# Patient Record
Sex: MALE | Race: WHITE | NOT HISPANIC OR LATINO
[De-identification: names, ages, dates, MRNs, and addresses within clinical notes are randomized per-mention and may not be internally consistent; named-entity substitution may affect disease eponyms.]

---

## 2023-06-01 ENCOUNTER — APPOINTMENT (OUTPATIENT)
Dept: PAIN MANAGEMENT | Facility: CLINIC | Age: 73
End: 2023-06-01
Payer: MEDICARE

## 2023-06-01 VITALS — WEIGHT: 149 LBS | HEIGHT: 65 IN | BODY MASS INDEX: 24.83 KG/M2

## 2023-06-01 DIAGNOSIS — Z87.39 PERSONAL HISTORY OF OTHER DISEASES OF THE MUSCULOSKELETAL SYSTEM AND CONNECTIVE TISSUE: ICD-10-CM

## 2023-06-01 DIAGNOSIS — M79.7 FIBROMYALGIA: ICD-10-CM

## 2023-06-01 PROCEDURE — 99212 OFFICE O/P EST SF 10 MIN: CPT

## 2023-06-01 RX ORDER — PREGABALIN 300 MG/1
CAPSULE ORAL
Refills: 0 | Status: ACTIVE | COMMUNITY

## 2023-06-01 RX ORDER — ROPINIROLE 5 MG/1
5 TABLET, FILM COATED ORAL
Refills: 0 | Status: ACTIVE | COMMUNITY

## 2023-06-01 NOTE — DISCUSSION/SUMMARY
[de-identified] : \par After discussing various treatment options with the patient including but not limited to oral medications, physical therapy, exercise, modalities as well as interventional spinal injections, we have decided with the following plan: \par \par - pharmacological: c/w lyrica 225mg BID\par   - we discussed the lack of benefit of opioid therapy for this type of pain\par - encouraged exercise. offered PT referral, but patient deferred \par - alternative list of pain providers given \par - follow up as needed\par \par Jose Wong MD\par \par

## 2023-06-01 NOTE — PHYSICAL EXAM
[de-identified] : Gen: NAD\par Gait: antalgic\par Psych:  Mood appropriate for given condition\par ROM: limited AROM of the L spine in all planes, full ROM at ankles, knees and hips  \par Sensation: intact to light touch in all dermatomes tested from L2-S1 bilaterally\par Reflexes: 2+ b/l patella and Achilles\par Palpation: Diffusely tender over lumbar paraspinals  -TTP over the b/l greater trochanters and bilateral SI joint\par Provacative tests: -SLR, and seated root (slump test) on the L/R, neg Reyna, JEB testing, FADIR testing\par \par 				Right	Left\par L2/3	Hip flexion		 5	 5\par L3/4	Knee Extension		 5	 5\par L4/5	Ankle Dorsiflexion 	 5	 5\par L5/S1	Great toe extension	 5	 5\par L4/5	Inversion		 5	 5\par L5/S1	Eversion		 5	 5\par

## 2023-06-01 NOTE — HISTORY OF PRESENT ILLNESS
[Head] : head [Neck] : neck [Upper back] : upper back [Lower back] : lower back [Left Arm] : left arm [Left Leg] : left leg [Right Arm] : right arm [Right Leg] : right leg [10] : 10 [Dull/Aching] : dull/aching [Radiating] : radiating [Sharp] : sharp [Shooting] : shooting [Stabbing] : stabbing [Tingling] : tingling [Constant] : constant [Household chores] : household chores [Leisure] : leisure [Sleep] : sleep [Social interactions] : social interactions [Nothing helps with pain getting better] : Nothing helps with pain getting better [Sitting] : sitting [Standing] : standing [Walking] : walking [Lying in bed] : lying in bed [FreeTextEntry1] : Initial HPI 6/1/23\par Mr. DELCID is a 72 year old M w/ hx fibromyalgia who presents for initial evaluation for multijoint and myofascial pain related to his fibromyalgia. Pain is located in shoulders, elbows, hands, hips, knees, feet. Pain started 30+ years ago and pain rated 7/10 or higher. It is not associated with any inciting injury. Pain is described as aching, shooting.  Pain is worsened with sitting, standing, laying down. Patient has failed conservative treatment with acetaminophen, NSAIDs, muscle relaxants, and physical therapy/HEP. Pain is causing significant functional limitation resulting in diminished quality of life and impaired age appropriate ADL's. Patient denies loss of bowel/bladder function, new motor deficits, fevers, or chills. He takes lyrica 225mg BID, which helps his pain. Has tried and failed numerous other antidepressants (TCAs, SNRIs). \par  [] : no [FreeTextEntry6] : numbness  [de-identified] : activity

## 2024-08-08 ENCOUNTER — APPOINTMENT (OUTPATIENT)
Dept: ORTHOPEDIC SURGERY | Facility: CLINIC | Age: 74
End: 2024-08-08

## 2024-08-08 PROBLEM — M54.9 BACK PAIN: Status: ACTIVE | Noted: 2024-08-08

## 2024-08-08 PROCEDURE — 72170 X-RAY EXAM OF PELVIS: CPT

## 2024-08-08 PROCEDURE — 72070 X-RAY EXAM THORAC SPINE 2VWS: CPT

## 2024-08-08 PROCEDURE — 99203 OFFICE O/P NEW LOW 30 MIN: CPT

## 2024-08-08 PROCEDURE — 72110 X-RAY EXAM L-2 SPINE 4/>VWS: CPT

## 2024-08-08 NOTE — HISTORY OF PRESENT ILLNESS
[de-identified] : 08/08/2024: Patient is here for evaluation of pain across the whole back due to kyphosis. Patient has pain while reaching for things, eating, most walking, most daily activities. Patient has been diagnosed with kyphosis for 2-3 years, first time coming to an orthopedist doctor.

## 2024-08-08 NOTE — ASSESSMENT
[FreeTextEntry1] : Discussed overall kyphosis Chronic appearing wedging thoracic spine Aware of renal stones on XR

## 2024-08-08 NOTE — IMAGING
[de-identified] : Skin intact T/L spine Motor exam non-focal, ambulating around room [Facet arthropathy] : Facet arthropathy [Spondylolithesis] : Spondylolithesis [Disc space narrowing] : Disc space narrowing [FreeTextEntry1] : Chronic appearing wedging

## 2024-08-19 ENCOUNTER — APPOINTMENT (OUTPATIENT)
Dept: PAIN MANAGEMENT | Facility: CLINIC | Age: 74
End: 2024-08-19

## 2024-08-19 VITALS — WEIGHT: 164 LBS | HEIGHT: 65 IN | BODY MASS INDEX: 27.32 KG/M2

## 2024-08-19 DIAGNOSIS — M79.18 MYALGIA, OTHER SITE: ICD-10-CM

## 2024-08-19 DIAGNOSIS — M40.14 OTHER SECONDARY KYPHOSIS, THORACIC REGION: ICD-10-CM

## 2024-08-19 PROCEDURE — 99204 OFFICE O/P NEW MOD 45 MIN: CPT

## 2024-08-19 RX ORDER — DICLOFENAC SODIUM 50 MG/1
50 TABLET, DELAYED RELEASE ORAL DAILY
Qty: 30 | Refills: 1 | Status: ACTIVE | COMMUNITY
Start: 2024-08-19 | End: 1900-01-01

## 2024-08-19 NOTE — HISTORY OF PRESENT ILLNESS
[Neck] : neck [Upper back] : upper back [Mid-back] : mid-back [Lower back] : lower back [Buttock] : buttock [10] : 10 [Burning] : burning [Dull/Aching] : dull/aching [Radiating] : radiating [Sharp] : sharp [Shooting] : shooting [Stabbing] : stabbing [Throbbing] : throbbing [Tightness] : tightness [Tingling] : tingling [Constant] : constant [Household chores] : household chores [Leisure] : leisure [Sleep] : sleep [Nothing helps with pain getting better] : Nothing helps with pain getting better [Sitting] : sitting [Standing] : standing [Walking] : walking [Stairs] : stairs [Lying in bed] : lying in bed [] : no [FreeTextEntry6] : numbness and tingling in b/l legs or hands [FreeTextEntry1] : b/l arms and legs  [FreeTextEntry7] : b/l legs or arms  [de-identified] : x-ray

## 2024-08-19 NOTE — PHYSICAL EXAM
[de-identified] : Gen: NAD, patient has kyphosis, uses a cane to assist with ambulation  Head: NC/AT Eyes: no glasses, no scleral icterus ENT: mucous membranes moist CV: No JVD Lungs: nonlabored breathing Abd: soft, NT/ND Ext: full ROM in all extremities, no peripheral edema Back: +TTP in the bilateral low lumbar facet region, very limited extension  Neuro: CN intact LEs +5 L +5 R hip flexion +5 L +5 R leg extension +5 L +5 R leg flexion +5 L +5 R foot dorsiflexion +5 L +5 R foot plantarflexion +5 L +5 R EHL extension Psych: normal affect Skin: no visible lesions

## 2024-08-19 NOTE — DISCUSSION/SUMMARY
[de-identified] : ANIA DELCID is a 74 year-old man presenting for a NPV for a history of chronic low back pain.   Prior treatment: Pregabalin 225mg  Cyclobenzaprine  Plan: 1) X-ray lumbar spine images reviewed with the patient - patient with diffuse degenerative changes in the spine, including anterolisthesis of L4 on L5 2) Obtain MRI lumbar spine w/o contrast 3) Continue pregabalin 225mg BID; denies AEs 4) Trial diclofenac 50mg daily prn; Discussed the risks of NSAIDs, including worsening HTN, cardiovascular risks, GI risk, renal injury. The patient was told not to take other NSAIDs with this and to consult with their PCP if there is a significant medical history to warrant this prior to initiating treatment. 5) RTC 2-4 weeks

## 2024-08-19 NOTE — PHYSICAL EXAM
[de-identified] : Gen: NAD, patient has kyphosis, uses a cane to assist with ambulation  Head: NC/AT Eyes: no glasses, no scleral icterus ENT: mucous membranes moist CV: No JVD Lungs: nonlabored breathing Abd: soft, NT/ND Ext: full ROM in all extremities, no peripheral edema Back: +TTP in the bilateral low lumbar facet region, very limited extension  Neuro: CN intact LEs +5 L +5 R hip flexion +5 L +5 R leg extension +5 L +5 R leg flexion +5 L +5 R foot dorsiflexion +5 L +5 R foot plantarflexion +5 L +5 R EHL extension Psych: normal affect Skin: no visible lesions

## 2024-08-19 NOTE — HISTORY OF PRESENT ILLNESS
[Neck] : neck [Upper back] : upper back [Mid-back] : mid-back [Lower back] : lower back [Buttock] : buttock [10] : 10 [Burning] : burning [Dull/Aching] : dull/aching [Radiating] : radiating [Sharp] : sharp [Shooting] : shooting [Stabbing] : stabbing [Throbbing] : throbbing [Tightness] : tightness [Tingling] : tingling [Constant] : constant [Household chores] : household chores [Leisure] : leisure [Sleep] : sleep [Nothing helps with pain getting better] : Nothing helps with pain getting better [Sitting] : sitting [Standing] : standing [Walking] : walking [Stairs] : stairs [Lying in bed] : lying in bed [] : no [FreeTextEntry1] : b/l arms and legs  [FreeTextEntry6] : numbness and tingling in b/l legs or hands [FreeTextEntry7] : b/l legs or arms  [de-identified] : x-ray

## 2024-08-19 NOTE — DISCUSSION/SUMMARY
[de-identified] : ANIA DELCID is a 74 year-old man presenting for a NPV for a history of chronic low back pain.   Prior treatment: Pregabalin 225mg  Cyclobenzaprine  Plan: 1) X-ray lumbar spine images reviewed with the patient - patient with diffuse degenerative changes in the spine, including anterolisthesis of L4 on L5 2) Obtain MRI lumbar spine w/o contrast 3) Continue pregabalin 225mg BID; denies AEs 4) Trial diclofenac 50mg daily prn; Discussed the risks of NSAIDs, including worsening HTN, cardiovascular risks, GI risk, renal injury. The patient was told not to take other NSAIDs with this and to consult with their PCP if there is a significant medical history to warrant this prior to initiating treatment. 5) RTC 2-4 weeks

## 2024-08-26 ENCOUNTER — APPOINTMENT (OUTPATIENT)
Dept: MRI IMAGING | Facility: CLINIC | Age: 74
End: 2024-08-26
Payer: MEDICARE

## 2024-08-26 PROCEDURE — 72146 MRI CHEST SPINE W/O DYE: CPT | Mod: MH

## 2024-08-30 PROBLEM — M51.36 DEGENERATIVE DISC DISEASE, LUMBAR: Status: ACTIVE | Noted: 2024-08-19

## 2024-08-30 PROBLEM — M54.42 CHRONIC BILATERAL LOW BACK PAIN WITH BILATERAL SCIATICA: Status: ACTIVE | Noted: 2024-08-19

## 2024-09-03 ENCOUNTER — APPOINTMENT (OUTPATIENT)
Dept: PAIN MANAGEMENT | Facility: CLINIC | Age: 74
End: 2024-09-03
Payer: MEDICARE

## 2024-09-03 VITALS — WEIGHT: 163 LBS | BODY MASS INDEX: 27.16 KG/M2 | HEIGHT: 65 IN

## 2024-09-03 DIAGNOSIS — M54.41 LUMBAGO WITH SCIATICA, LEFT SIDE: ICD-10-CM

## 2024-09-03 DIAGNOSIS — M54.42 LUMBAGO WITH SCIATICA, LEFT SIDE: ICD-10-CM

## 2024-09-03 DIAGNOSIS — M79.7 MYALGIC ENCEPHALOMYELITIS/CHRONIC FATIGUE SYNDROME: ICD-10-CM

## 2024-09-03 DIAGNOSIS — Z87.448 PERSONAL HISTORY OF OTHER DISEASES OF URINARY SYSTEM: ICD-10-CM

## 2024-09-03 DIAGNOSIS — G93.32 MYALGIC ENCEPHALOMYELITIS/CHRONIC FATIGUE SYNDROME: ICD-10-CM

## 2024-09-03 DIAGNOSIS — G89.29 LUMBAGO WITH SCIATICA, LEFT SIDE: ICD-10-CM

## 2024-09-03 DIAGNOSIS — M51.36 OTHER INTERVERTEBRAL DISC DEGENERATION, LUMBAR REGION: ICD-10-CM

## 2024-09-03 PROCEDURE — 99214 OFFICE O/P EST MOD 30 MIN: CPT

## 2024-09-03 NOTE — DISCUSSION/SUMMARY
[de-identified] : ANIA DELCID is a 74 year-old man presenting for a RPV for a history of chronic low back pain.   Prior treatment: Pregabalin 225mg Duloxetine Cyclobenzaprine  Plan: 1) MRI lumbar spine images reviewed with the patient. 2) Discussed L5-S1 ILESI w/o MAC. The procedure was explained to the patient in detail. Reviewed risks, benefits, and alternatives with the patient. Some risks discussed included temporary increase in pain, bleeding, infection, and side effects from steroids. The patient expressed understanding and would like to defer.  3) Continue pregabalin 225mg BID; denies AEs 4) Avoid NSAIDS 2/2 CKD 5) Had an extensive discussion with the patient regarding chronic benzodiazepines and discussed that I cannot write him for this medication--patient is requesting diazepam 6) RTC prn

## 2024-09-03 NOTE — HISTORY OF PRESENT ILLNESS
[Neck] : neck [Upper back] : upper back [Mid-back] : mid-back [Lower back] : lower back [Buttock] : buttock [10] : 10 [Burning] : burning [Dull/Aching] : dull/aching [Radiating] : radiating [Sharp] : sharp [Shooting] : shooting [Stabbing] : stabbing [Throbbing] : throbbing [Tightness] : tightness [Tingling] : tingling [Constant] : constant [Household chores] : household chores [Leisure] : leisure [Sleep] : sleep [Nothing helps with pain getting better] : Nothing helps with pain getting better [Sitting] : sitting [Standing] : standing [Walking] : walking [Stairs] : stairs [Lying in bed] : lying in bed [] : no [FreeTextEntry1] : b/l arms and legs  [FreeTextEntry6] : numbness and tingling in b/l legs or hands [FreeTextEntry7] : b/l legs or arms  [de-identified] : x-ray

## 2024-09-03 NOTE — PHYSICAL EXAM
[de-identified] : Gen: NAD, patient has kyphosis, uses a cane to assist with ambulation  Head: NC/AT Eyes: no glasses, no scleral icterus ENT: mucous membranes moist CV: No JVD Lungs: nonlabored breathing Abd: soft, NT/ND Ext: full ROM in all extremities, no peripheral edema Back: +TTP in the bilateral low lumbar facet region, very limited extension  Neuro: CN intact LEs +5 L +5 R hip flexion +5 L +5 R leg extension +5 L +5 R leg flexion +5 L +5 R foot dorsiflexion +5 L +5 R foot plantarflexion +5 L +5 R EHL extension Psych: normal affect Skin: no visible lesions

## 2024-09-03 NOTE — HISTORY OF PRESENT ILLNESS
[Neck] : neck [Upper back] : upper back [Mid-back] : mid-back [Lower back] : lower back [Buttock] : buttock [10] : 10 [Burning] : burning [Dull/Aching] : dull/aching [Radiating] : radiating [Sharp] : sharp [Shooting] : shooting [Stabbing] : stabbing [Throbbing] : throbbing [Tightness] : tightness [Tingling] : tingling [Constant] : constant [Household chores] : household chores [Leisure] : leisure [Sleep] : sleep [Nothing helps with pain getting better] : Nothing helps with pain getting better [Sitting] : sitting [Standing] : standing [Walking] : walking [Stairs] : stairs [Lying in bed] : lying in bed [] : no [FreeTextEntry1] : b/l arms and legs  [FreeTextEntry6] : numbness and tingling in b/l legs or hands [FreeTextEntry7] : b/l legs or arms  [de-identified] : x-ray

## 2024-09-03 NOTE — HISTORY OF PRESENT ILLNESS
[Neck] : neck [Upper back] : upper back [Mid-back] : mid-back [Lower back] : lower back [Buttock] : buttock [10] : 10 [Burning] : burning [Dull/Aching] : dull/aching [Radiating] : radiating [Sharp] : sharp [Shooting] : shooting [Stabbing] : stabbing [Throbbing] : throbbing [Tightness] : tightness [Tingling] : tingling [Constant] : constant [Household chores] : household chores [Leisure] : leisure [Sleep] : sleep [Nothing helps with pain getting better] : Nothing helps with pain getting better [Sitting] : sitting [Standing] : standing [Walking] : walking [Stairs] : stairs [Lying in bed] : lying in bed [] : no [FreeTextEntry1] : b/l arms and legs  [FreeTextEntry6] : numbness and tingling in b/l legs or hands [FreeTextEntry7] : b/l legs or arms  [de-identified] : x-ray

## 2024-09-03 NOTE — PHYSICAL EXAM
[de-identified] : Gen: NAD, patient has kyphosis, uses a cane to assist with ambulation  Head: NC/AT Eyes: no glasses, no scleral icterus ENT: mucous membranes moist CV: No JVD Lungs: nonlabored breathing Abd: soft, NT/ND Ext: full ROM in all extremities, no peripheral edema Back: +TTP in the bilateral low lumbar facet region, very limited extension  Neuro: CN intact LEs +5 L +5 R hip flexion +5 L +5 R leg extension +5 L +5 R leg flexion +5 L +5 R foot dorsiflexion +5 L +5 R foot plantarflexion +5 L +5 R EHL extension Psych: normal affect Skin: no visible lesions

## 2024-09-03 NOTE — PHYSICAL EXAM
[de-identified] : Gen: NAD, patient has kyphosis, uses a cane to assist with ambulation  Head: NC/AT Eyes: no glasses, no scleral icterus ENT: mucous membranes moist CV: No JVD Lungs: nonlabored breathing Abd: soft, NT/ND Ext: full ROM in all extremities, no peripheral edema Back: +TTP in the bilateral low lumbar facet region, very limited extension  Neuro: CN intact LEs +5 L +5 R hip flexion +5 L +5 R leg extension +5 L +5 R leg flexion +5 L +5 R foot dorsiflexion +5 L +5 R foot plantarflexion +5 L +5 R EHL extension Psych: normal affect Skin: no visible lesions

## 2024-09-03 NOTE — DATA REVIEWED
[MRI] : MRI [Lumbar Spine] : lumbar spine [Report was reviewed and noted in the chart] : The report was reviewed and noted in the chart [I independently reviewed and interpreted images and report] : I independently reviewed and interpreted images and report [FreeTextEntry1] : 8/26/2024 MRI Lumbar Spine O&C L2-L3: minor retrolisthesis; broad disc bulge, facet hypertrophy, ligamentum flavum hypertrophy, left facet effusion, inferior RIGHT foraminal herniation, NF stenosis RIGHT > left; mild central stenosis L3-L4: minor retrolisthesis; broad disc bulge, facet hypertrophy, ligamentum flavum hypertrophy, facet effusion w/ foraminal extension of bulges, bilateral NF stenosis; broad central herniation, mild central stenosis L4-L5: minor retrolisthesis; broad disc bulge, facet hypertrophy, foraminal extension of bulges w/ bilateral inferior NF stenosis, broad disc bulge w/ mild central stenosis L5-S1: grade I anterolisthesis, broad disc bulge, facet hypertrophy, ligamentum flavum hypertrophy, facet effusion w/ foraminal extension of bulges and NF stenosis

## 2024-09-03 NOTE — DISCUSSION/SUMMARY
[de-identified] : ANIA DELCID is a 74 year-old man presenting for a RPV for a history of chronic low back pain.   Prior treatment: Pregabalin 225mg Duloxetine Cyclobenzaprine  Plan: 1) MRI lumbar spine images reviewed with the patient. 2) Discussed L5-S1 ILESI w/o MAC. The procedure was explained to the patient in detail. Reviewed risks, benefits, and alternatives with the patient. Some risks discussed included temporary increase in pain, bleeding, infection, and side effects from steroids. The patient expressed understanding and would like to defer.  3) Continue pregabalin 225mg BID; denies AEs 4) Avoid NSAIDS 2/2 CKD 5) Had an extensive discussion with the patient regarding chronic benzodiazepines and discussed that I cannot write him for this medication--patient is requesting diazepam 6) RTC prn

## 2024-09-03 NOTE — DISCUSSION/SUMMARY
[de-identified] : ANIA DELCID is a 74 year-old man presenting for a RPV for a history of chronic low back pain.   Prior treatment: Pregabalin 225mg Duloxetine Cyclobenzaprine  Plan: 1) MRI lumbar spine images reviewed with the patient. 2) Discussed L5-S1 ILESI w/o MAC. The procedure was explained to the patient in detail. Reviewed risks, benefits, and alternatives with the patient. Some risks discussed included temporary increase in pain, bleeding, infection, and side effects from steroids. The patient expressed understanding and would like to defer.  3) Continue pregabalin 225mg BID; denies AEs 4) Avoid NSAIDS 2/2 CKD 5) Had an extensive discussion with the patient regarding chronic benzodiazepines and discussed that I cannot write him for this medication--patient is requesting diazepam 6) RTC prn

## 2024-09-24 ENCOUNTER — APPOINTMENT (OUTPATIENT)
Dept: PAIN MANAGEMENT | Facility: CLINIC | Age: 74
End: 2024-09-24

## 2024-09-24 DIAGNOSIS — G89.29 LUMBAGO WITH SCIATICA, LEFT SIDE: ICD-10-CM

## 2024-09-24 DIAGNOSIS — M54.42 LUMBAGO WITH SCIATICA, LEFT SIDE: ICD-10-CM

## 2024-09-24 DIAGNOSIS — M54.41 LUMBAGO WITH SCIATICA, LEFT SIDE: ICD-10-CM

## 2024-09-24 DIAGNOSIS — M51.36 OTHER INTERVERTEBRAL DISC DEGENERATION, LUMBAR REGION: ICD-10-CM

## 2024-09-24 PROCEDURE — 62323 NJX INTERLAMINAR LMBR/SAC: CPT

## 2024-09-24 NOTE — PROCEDURE
[FreeTextEntry1] : L5-S1 lumbar interlaminar epidural steroid injection [FreeTextEntry2] : chronic lumbar radiculopathy [FreeTextEntry3] : Procedure Date: 09/24/2024   Preoperative Diagnosis: chronic low back pain with bilateral sciatica   Procedure: L5-S1 epidural steroid injection under fluoroscopic guidance   Anesthesia: local   Complications: none   EBL: none   Procedure in detail: Patient was seen and examined. Risks, benefits, and alternatives for the procedure were discussed with the patient in detail. The patient expressed understanding, gave written and verbal consent, and placed themselves in a prone position on the procedure table. Skin overlying the lumbosacral spine was prepped with chloraprep and draped in the usual sterile fashion. Fluoroscopic images were obtained to identify the L5 and S1 vertebral body. Target was the interlaminar space between the L5 and S1 vertebral body. Skin overlying the target was marked and infiltrated with 1% lidocaine. Using an 20 gauge, 9cm Tuohy needle, this was inserted and advanced under intermittent fluoroscopic guidance. When felt to be engaged in the epidural space, lateral view was used to confirm depth. Needle was advanced until loss of resistance to saline was achieved. After negative aspiration for heme/csf, contrast was injected under live fluoroscopy, which showed contrast spread consistent with epidural flow. No evidence of intravascular or intrathecal uptake. At this point, a total of 3ml of injectate, which consisted of 1ml of 80mg/ml depomedrol and 2ml of normal saline were injected. The needle was restyletted and withdrawn, a band aid was placed over the injection site. Patient tolerated the procedure well. The patient recovered uneventfully and was discharged home in stable condition.

## 2024-10-10 ENCOUNTER — APPOINTMENT (OUTPATIENT)
Dept: PAIN MANAGEMENT | Facility: CLINIC | Age: 74
End: 2024-10-10
Payer: MEDICARE

## 2024-10-10 VITALS — BODY MASS INDEX: 27.66 KG/M2 | HEIGHT: 65 IN | WEIGHT: 166 LBS

## 2024-10-10 DIAGNOSIS — G89.29 LUMBAGO WITH SCIATICA, LEFT SIDE: ICD-10-CM

## 2024-10-10 DIAGNOSIS — M54.41 LUMBAGO WITH SCIATICA, LEFT SIDE: ICD-10-CM

## 2024-10-10 DIAGNOSIS — M79.18 MYALGIA, OTHER SITE: ICD-10-CM

## 2024-10-10 DIAGNOSIS — M54.42 LUMBAGO WITH SCIATICA, LEFT SIDE: ICD-10-CM

## 2024-10-10 DIAGNOSIS — M51.369: ICD-10-CM

## 2024-10-10 DIAGNOSIS — G93.32 MYALGIC ENCEPHALOMYELITIS/CHRONIC FATIGUE SYNDROME: ICD-10-CM

## 2024-10-10 DIAGNOSIS — M79.7 MYALGIC ENCEPHALOMYELITIS/CHRONIC FATIGUE SYNDROME: ICD-10-CM

## 2024-10-10 PROCEDURE — 99214 OFFICE O/P EST MOD 30 MIN: CPT

## 2024-10-10 RX ORDER — BACLOFEN 10 MG/1
10 TABLET ORAL 3 TIMES DAILY
Qty: 90 | Refills: 1 | Status: ACTIVE | COMMUNITY
Start: 2024-10-10 | End: 1900-01-01

## 2024-10-31 ENCOUNTER — APPOINTMENT (OUTPATIENT)
Dept: FAMILY MEDICINE | Facility: CLINIC | Age: 74
End: 2024-10-31

## 2024-11-25 ENCOUNTER — APPOINTMENT (OUTPATIENT)
Dept: PAIN MANAGEMENT | Facility: CLINIC | Age: 74
End: 2024-11-25